# Patient Record
Sex: FEMALE | Race: BLACK OR AFRICAN AMERICAN | NOT HISPANIC OR LATINO | Employment: PART TIME | URBAN - METROPOLITAN AREA
[De-identification: names, ages, dates, MRNs, and addresses within clinical notes are randomized per-mention and may not be internally consistent; named-entity substitution may affect disease eponyms.]

---

## 2019-05-10 ENCOUNTER — HOSPITAL ENCOUNTER (EMERGENCY)
Facility: HOSPITAL | Age: 54
Discharge: HOME | End: 2019-05-10
Attending: EMERGENCY MEDICINE
Payer: MEDICAID

## 2019-05-10 VITALS
WEIGHT: 159 LBS | DIASTOLIC BLOOD PRESSURE: 86 MMHG | RESPIRATION RATE: 16 BRPM | BODY MASS INDEX: 26.49 KG/M2 | SYSTOLIC BLOOD PRESSURE: 135 MMHG | OXYGEN SATURATION: 99 % | HEIGHT: 65 IN | TEMPERATURE: 99 F | HEART RATE: 81 BPM

## 2019-05-10 DIAGNOSIS — R51.9 ACUTE NONINTRACTABLE HEADACHE, UNSPECIFIED HEADACHE TYPE: Primary | ICD-10-CM

## 2019-05-10 LAB
ALBUMIN SERPL-MCNC: 4 G/DL (ref 3.3–5.5)
ALP SERPL-CCNC: 75 U/L (ref 42–141)
BILIRUB SERPL-MCNC: 0.8 MG/DL (ref 0.2–1.6)
BILIRUBIN, POC UA: NEGATIVE
BLOOD, POC UA: ABNORMAL
BUN SERPL-MCNC: 11 MG/DL (ref 7–22)
CALCIUM SERPL-MCNC: 8.8 MG/DL (ref 8–10.3)
CHLORIDE SERPL-SCNC: 100 MMOL/L (ref 98–108)
CLARITY, POC UA: CLEAR
COLOR, POC UA: YELLOW
CREAT SERPL-MCNC: 0.9 MG/DL (ref 0.6–1.2)
GLUCOSE SERPL-MCNC: 89 MG/DL (ref 73–118)
GLUCOSE, POC UA: NEGATIVE
KETONES, POC UA: NEGATIVE
LEUKOCYTE EST, POC UA: NEGATIVE
NITRITE, POC UA: NEGATIVE
PH UR STRIP: 6 [PH]
POC ALT (SGPT): 41 U/L (ref 10–47)
POC AST (SGOT): 66 U/L (ref 11–38)
POC TCO2: 25 MMOL/L (ref 18–33)
POTASSIUM BLD-SCNC: 4.2 MMOL/L (ref 3.6–5.1)
PROTEIN, POC UA: NEGATIVE
PROTEIN, POC: 7 G/DL (ref 6.4–8.1)
SODIUM BLD-SCNC: 134 MMOL/L (ref 128–145)
SPECIFIC GRAVITY, POC UA: >=1.03
UROBILINOGEN, POC UA: 1 E.U./DL

## 2019-05-10 PROCEDURE — 96361 HYDRATE IV INFUSION ADD-ON: CPT | Mod: ER

## 2019-05-10 PROCEDURE — 85025 COMPLETE CBC W/AUTO DIFF WBC: CPT | Mod: ER

## 2019-05-10 PROCEDURE — 96374 THER/PROPH/DIAG INJ IV PUSH: CPT | Mod: ER

## 2019-05-10 PROCEDURE — 63600175 PHARM REV CODE 636 W HCPCS: Mod: ER | Performed by: EMERGENCY MEDICINE

## 2019-05-10 PROCEDURE — 80053 COMPREHEN METABOLIC PANEL: CPT | Mod: ER

## 2019-05-10 PROCEDURE — 99285 EMERGENCY DEPT VISIT HI MDM: CPT | Mod: 25,ER

## 2019-05-10 PROCEDURE — 25000003 PHARM REV CODE 250: Mod: ER | Performed by: EMERGENCY MEDICINE

## 2019-05-10 PROCEDURE — 81003 URINALYSIS AUTO W/O SCOPE: CPT | Mod: ER

## 2019-05-10 RX ORDER — KETOROLAC TROMETHAMINE 30 MG/ML
15 INJECTION, SOLUTION INTRAMUSCULAR; INTRAVENOUS
Status: COMPLETED | OUTPATIENT
Start: 2019-05-10 | End: 2019-05-10

## 2019-05-10 RX ORDER — DIPHENHYDRAMINE HCL 25 MG
25 CAPSULE ORAL EVERY 6 HOURS PRN
Qty: 20 CAPSULE | Refills: 0 | Status: SHIPPED | OUTPATIENT
Start: 2019-05-10

## 2019-05-10 RX ORDER — PROMETHAZINE HYDROCHLORIDE 25 MG/1
25 TABLET ORAL EVERY 6 HOURS PRN
Qty: 15 TABLET | Refills: 0 | Status: SHIPPED | OUTPATIENT
Start: 2019-05-10

## 2019-05-10 RX ORDER — FLUTICASONE PROPIONATE 50 MCG
1 SPRAY, SUSPENSION (ML) NASAL 2 TIMES DAILY
Qty: 1 BOTTLE | Refills: 0 | Status: SHIPPED | OUTPATIENT
Start: 2019-05-10

## 2019-05-10 RX ORDER — ACETAMINOPHEN 500 MG
1000 TABLET ORAL EVERY 6 HOURS PRN
Qty: 30 TABLET | Refills: 0 | Status: SHIPPED | OUTPATIENT
Start: 2019-05-10

## 2019-05-10 RX ORDER — IBUPROFEN 600 MG/1
600 TABLET ORAL EVERY 6 HOURS PRN
Qty: 20 TABLET | Refills: 0 | Status: SHIPPED | OUTPATIENT
Start: 2019-05-10

## 2019-05-10 RX ORDER — LORATADINE 10 MG/1
10 TABLET ORAL DAILY
Qty: 60 TABLET | Refills: 0 | Status: SHIPPED | OUTPATIENT
Start: 2019-05-10 | End: 2020-05-09

## 2019-05-10 RX ADMIN — SODIUM CHLORIDE 1000 ML: 0.9 INJECTION, SOLUTION INTRAVENOUS at 09:05

## 2019-05-10 RX ADMIN — KETOROLAC TROMETHAMINE 15 MG: 30 INJECTION, SOLUTION INTRAMUSCULAR at 09:05

## 2019-05-10 NOTE — ED PROVIDER NOTES
Encounter Date: 5/10/2019    SCRIBE #1 NOTE: I, Rosibel Stringer, am scribing for, and in the presence of,  Dr. Castellanos . I have scribed the following portions of the note - Other sections scribed: HPI,ROS,PE .       History     Chief Complaint   Patient presents with    Headache     pt reports headache that woke her up out of her sleep at 0200 this AM- took 1 aleve at 0530 with no relief     54 y/o/f without any PmHx presents with constant right temporal and near right eye headache that woke her up out of her sleep this morning at 2 am. Describes the pain as initially pressure but now feeling sharp/stabbing near the temple. Rates the pain a 10/10. Also complaining of sinus pressure. She took 1 aleve and genna seltzer cold and cough without relief. Patient has no hx of headache. FHx of aneurysms and stroke. Denies visual changes, numbness, tingling, chest pain, SOB, N/V/D, weakness, neck pain or fever.     The history is provided by the patient. No  was used.     Review of patient's allergies indicates:   Allergen Reactions    Codeine Itching    Morphine Itching     Past Medical History:   Diagnosis Date    Abnormal Pap smear of vagina      Past Surgical History:   Procedure Laterality Date    bunone removal (Right)  Right 1998    LASER ABLATION OF THE CERVIX  2000     Family History   Problem Relation Age of Onset    Stroke Father     Ovarian cancer Paternal Grandmother     Stroke Maternal Grandmother     Cancer Sister         hodgekin lymphoma, MI, CVA, PE     Social History     Tobacco Use    Smoking status: Never Smoker   Substance Use Topics    Alcohol use: No    Drug use: No     Review of Systems   Constitutional: Negative for fever.   HENT: Positive for sinus pressure. Negative for sore throat.    Eyes: Negative for visual disturbance.   Respiratory: Negative for shortness of breath.    Cardiovascular: Negative for chest pain.   Gastrointestinal: Negative for diarrhea, nausea  and vomiting.   Genitourinary: Negative for dysuria.   Musculoskeletal: Negative for back pain and neck pain.   Skin: Negative for rash.   Neurological: Positive for headaches. Negative for weakness and numbness.   Hematological: Does not bruise/bleed easily.   All other systems reviewed and are negative.      Physical Exam     Initial Vitals [05/10/19 0827]   BP Pulse Resp Temp SpO2   (!) 123/90 98 18 98.8 °F (37.1 °C) 97 %      MAP       --         Physical Exam    Nursing note and vitals reviewed.  Constitutional: She appears well-developed and well-nourished.   HENT:   Head: Normocephalic and atraumatic.   Right Ear: Tympanic membrane and external ear normal.   Left Ear: Tympanic membrane and external ear normal.   Nose: Mucosal edema and rhinorrhea present.   Mouth/Throat: Oropharynx is clear and moist.   Clear nasal discharge.   Eyes: Conjunctivae are normal.   Neck: Normal range of motion. Neck supple.   Cardiovascular: Normal rate, regular rhythm, normal heart sounds and intact distal pulses. Exam reveals no gallop and no friction rub.    No murmur heard.  Pulmonary/Chest: Effort normal and breath sounds normal. No respiratory distress. She has no wheezes. She has no rhonchi. She has no rales.   Musculoskeletal: Normal range of motion.   Neurological: She is alert and oriented to person, place, and time. She has normal strength. No cranial nerve deficit or sensory deficit. GCS score is 15. GCS eye subscore is 4. GCS verbal subscore is 5. GCS motor subscore is 6.   Skin: Skin is warm and dry. Capillary refill takes less than 2 seconds. No rash noted.   Psychiatric: She has a normal mood and affect. Her behavior is normal.       Patient gave consent to have physical exam performed.      ED Course   Procedures  Labs Reviewed   POCT URINALYSIS W/O SCOPE - Abnormal; Notable for the following components:       Result Value    Glucose, UA Negative (*)     Bilirubin, UA Negative (*)     Ketones, UA Negative (*)      Spec Grav UA >=1.030 (*)     Blood, UA 1+ (*)     Protein, UA Negative (*)     Nitrite, UA Negative (*)     Leukocytes, UA Negative (*)     All other components within normal limits   POCT CMP - Abnormal; Notable for the following components:    AST (SGOT), POC 66 (*)     All other components within normal limits   POCT CBC   POCT URINALYSIS W/O SCOPE   POCT CMP         CBC white blood cell count is 2.9, hemoglobin 14.7, hematocrit 43.4 and platelets 169.  UA negative glucose negative ketones negative nitrates negative leukocytes.  CMP within normal limits  Imaging Results          CT Head Without Contrast (Final result)  Result time 05/10/19 09:41:58    Final result by Tara Cano MD (05/10/19 09:41:58)                 Impression:      No acute abnormality.      Electronically signed by: Tara Cano MD  Date:    05/10/2019  Time:    09:41             Narrative:    EXAMINATION:  CT HEAD WITHOUT CONTRAST    CLINICAL HISTORY:  Headache, acute, norm neuro exam;    TECHNIQUE:  Low dose axial CT images obtained throughout the head without intravenous contrast. Sagittal and coronal reconstructions were performed.    COMPARISON:  None.    FINDINGS:  Intracranial compartment:    Ventricles and sulci are normal in size for age without evidence of hydrocephalus. No extra-axial blood or fluid collections.    The brain parenchyma appears normal. No parenchymal mass, hemorrhage, edema or major vascular distribution infarct.    Skull/extracranial contents (limited evaluation): No fracture. Mastoid air cells and paranasal sinuses are essentially clear.                                 Medical Decision Making:   Clinical Tests:   Lab Tests: Ordered and Reviewed  Radiological Study: Ordered and Reviewed  ED Management:  CT head ordered. IV fluids and toradol.      Treatment in the ED Physical Exam, Toradol and IV fluids  Patient reports feeling better after medication.    Discussed labs, and imaging results.    Fill and  take prescriptions as directed.  Return to the ED if symptoms worsen or do not resolve.   Answered questions and discussed discharge plan.    Patient feels better and is ready for discharge.  Follow up with PCP/specialist in 1 day.            Scribe Attestation:   Scribe #1: I performed the above scribed service and the documentation accurately describes the services I performed. I attest to the accuracy of the note.     I, Dr. Mary Castellanos, personally performed the services described in this documentation. This document was produced by a scribe under my direction and in my presence. All medical record entries made by the scribe were at my direction and in my presence.  I have reviewed the chart and agree that the record reflects my personal performance and is accurate and complete. Mary Castellanos DO.     05/10/2019 10:39 AM             Clinical Impression:     1. Acute nonintractable headache, unspecified headache type                                   Mary Castellanos DO  05/12/19 [id number]

## 2019-05-10 NOTE — DISCHARGE INSTRUCTIONS
At onset of headache take ibuprofen and Tylenol.  If headache persists 1 hr later take Benadryl and Phenergan and go to sleep in a dark quiet room

## 2022-01-24 ENCOUNTER — OFFICE VISIT (OUTPATIENT)
Dept: URGENT CARE | Facility: CLINIC | Age: 57
End: 2022-01-24
Payer: COMMERCIAL

## 2022-01-24 VITALS
HEART RATE: 92 BPM | WEIGHT: 155 LBS | OXYGEN SATURATION: 97 % | BODY MASS INDEX: 25.83 KG/M2 | TEMPERATURE: 98 F | DIASTOLIC BLOOD PRESSURE: 89 MMHG | RESPIRATION RATE: 16 BRPM | SYSTOLIC BLOOD PRESSURE: 128 MMHG | HEIGHT: 65 IN

## 2022-01-24 DIAGNOSIS — M25.551 HIP PAIN, ACUTE, RIGHT: ICD-10-CM

## 2022-01-24 DIAGNOSIS — M54.9 UPPER BACK PAIN: ICD-10-CM

## 2022-01-24 DIAGNOSIS — M54.41 ACUTE BILATERAL LOW BACK PAIN WITH RIGHT-SIDED SCIATICA: ICD-10-CM

## 2022-01-24 DIAGNOSIS — S16.1XXA STRAIN OF NECK MUSCLE, INITIAL ENCOUNTER: Primary | ICD-10-CM

## 2022-01-24 DIAGNOSIS — M54.2 NECK PAIN: ICD-10-CM

## 2022-01-24 PROCEDURE — 1160F RVW MEDS BY RX/DR IN RCRD: CPT | Mod: CPTII,S$GLB,, | Performed by: NURSE PRACTITIONER

## 2022-01-24 PROCEDURE — 72070 X-RAY EXAM THORAC SPINE 2VWS: CPT | Mod: S$GLB,,, | Performed by: RADIOLOGY

## 2022-01-24 PROCEDURE — 3079F PR MOST RECENT DIASTOLIC BLOOD PRESSURE 80-89 MM HG: ICD-10-PCS | Mod: CPTII,S$GLB,, | Performed by: NURSE PRACTITIONER

## 2022-01-24 PROCEDURE — 72040 XR CERVICAL SPINE 2 OR 3 VIEWS: ICD-10-PCS | Mod: S$GLB,,, | Performed by: RADIOLOGY

## 2022-01-24 PROCEDURE — 72040 X-RAY EXAM NECK SPINE 2-3 VW: CPT | Mod: S$GLB,,, | Performed by: RADIOLOGY

## 2022-01-24 PROCEDURE — 72100 XR LUMBAR SPINE 2 OR 3 VIEWS: ICD-10-PCS | Mod: S$GLB,,, | Performed by: RADIOLOGY

## 2022-01-24 PROCEDURE — 3008F BODY MASS INDEX DOCD: CPT | Mod: CPTII,S$GLB,, | Performed by: NURSE PRACTITIONER

## 2022-01-24 PROCEDURE — 72100 X-RAY EXAM L-S SPINE 2/3 VWS: CPT | Mod: S$GLB,,, | Performed by: RADIOLOGY

## 2022-01-24 PROCEDURE — 73502 XR HIP WITH PELVIS WHEN PERFORMED, 2 OR 3  VIEWS RIGHT: ICD-10-PCS | Mod: RT,S$GLB,, | Performed by: RADIOLOGY

## 2022-01-24 PROCEDURE — 3008F PR BODY MASS INDEX (BMI) DOCUMENTED: ICD-10-PCS | Mod: CPTII,S$GLB,, | Performed by: NURSE PRACTITIONER

## 2022-01-24 PROCEDURE — 72070 XR THORACIC SPINE AP LATERAL: ICD-10-PCS | Mod: S$GLB,,, | Performed by: RADIOLOGY

## 2022-01-24 PROCEDURE — 1160F PR REVIEW ALL MEDS BY PRESCRIBER/CLIN PHARMACIST DOCUMENTED: ICD-10-PCS | Mod: CPTII,S$GLB,, | Performed by: NURSE PRACTITIONER

## 2022-01-24 PROCEDURE — 3074F SYST BP LT 130 MM HG: CPT | Mod: CPTII,S$GLB,, | Performed by: NURSE PRACTITIONER

## 2022-01-24 PROCEDURE — 1159F PR MEDICATION LIST DOCUMENTED IN MEDICAL RECORD: ICD-10-PCS | Mod: CPTII,S$GLB,, | Performed by: NURSE PRACTITIONER

## 2022-01-24 PROCEDURE — 99203 OFFICE O/P NEW LOW 30 MIN: CPT | Mod: S$GLB,,, | Performed by: NURSE PRACTITIONER

## 2022-01-24 PROCEDURE — 1159F MED LIST DOCD IN RCRD: CPT | Mod: CPTII,S$GLB,, | Performed by: NURSE PRACTITIONER

## 2022-01-24 PROCEDURE — 99203 PR OFFICE/OUTPT VISIT, NEW, LEVL III, 30-44 MIN: ICD-10-PCS | Mod: S$GLB,,, | Performed by: NURSE PRACTITIONER

## 2022-01-24 PROCEDURE — 3074F PR MOST RECENT SYSTOLIC BLOOD PRESSURE < 130 MM HG: ICD-10-PCS | Mod: CPTII,S$GLB,, | Performed by: NURSE PRACTITIONER

## 2022-01-24 PROCEDURE — 73502 X-RAY EXAM HIP UNI 2-3 VIEWS: CPT | Mod: RT,S$GLB,, | Performed by: RADIOLOGY

## 2022-01-24 PROCEDURE — 3079F DIAST BP 80-89 MM HG: CPT | Mod: CPTII,S$GLB,, | Performed by: NURSE PRACTITIONER

## 2022-01-24 RX ORDER — NAPROXEN 500 MG/1
500 TABLET ORAL 2 TIMES DAILY PRN
Qty: 30 TABLET | Refills: 0 | Status: SHIPPED | OUTPATIENT
Start: 2022-01-24

## 2022-01-24 RX ORDER — METHOCARBAMOL 500 MG/1
500 TABLET, FILM COATED ORAL 3 TIMES DAILY PRN
Qty: 21 TABLET | Refills: 0 | Status: SHIPPED | OUTPATIENT
Start: 2022-01-24

## 2022-01-24 NOTE — PROGRESS NOTES
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 Lift one leg up and kick it straight out until it is in line with your back and hold. Lower it back down. Now, lift up the other leg and hold.  ? Lift one arm and the OPPOSITE leg up at the same time and hold. Lower them down. Now, repeat using the other arm and leg. This is a very hard exercise. It may take time to be able to do this.             What will the results be?   · Better flexibility  · Less pain  · Less numbness and tingling  · More leg strength  · Easier to walk and do other activities  · Increased core strength  Helpful tips   · Stay active and work out to keep your muscles strong and flexible.  · Keep a healthy weight to avoid putting too much stress on your spine. Eat a healthy diet to keep your muscles healthy.  · Be sure you do not hold your breath when exercising. This can raise your blood pressure. If you tend to hold your breath, try counting out loud when exercising. If any exercise bothers you, stop right away.  · Always warm up before stretching. Heated muscles stretch much easier than cool muscles. Stretching cool muscles can lead to injury.  · Try walking or cycling at an easy pace for a few minutes to warm up your muscles. Do this again after exercising.  · Never bounce when doing stretches.  · Apply ice to the low  back on the side of leg pain.  · Doing exercises before a meal may be a good way to get into a routine.  Where can I learn more?   [facility] Choices  http://www.nhs.uk/[facility]/Backpain/Pages/sciatica-exercises.aspx   Last Reviewed Date   2021-08-30  Consumer Information Use and Disclaimer   This information is not specific medical advice and does not replace information you receive from your health care provider. This is only a brief summary of general information. It does NOT include all information about conditions, illnesses, injuries, tests, procedures, treatments, therapies, discharge instructions or life-style choices that may apply to you. You must talk with your health care provider for complete information about your health and treatment options. This information should not be used to decide whether or not to accept your health care providers advice, instructions or recommendations. Only your health care provider has the knowledge and training to provide advice that is right for you.  Copyright   Copyright © 2021 [employer] and its affiliates and/or licensors. All rights reserved.  Patient Education       Low Back Pain Discharge Instructions   About this topic   Low back pain is a pain or discomfort in the lower part of your back and spinal column. You may have a muscle strain. This happens when a muscle is stretched too much or works too hard. It can also happen if a muscle is stretched too quickly. This is also known as a pulled muscle. Many people have low back pain at some point and it most often gets better on its own.       What care is needed at home?   Back pain is common. In most cases, your back will feel better in 1 to 3 weeks. You may need to have help at home if you are not able to do your normal activities right away. Some people need help with things like cooking or bathing.  · Ask your doctor what you need to do when you go home. Make sure you ask questions if you do not understand what the  doctor says. This way you will know what you need to do.  · For the first 2 days, put ice on your back a few times a day. Wrap an ice pack in a towel and put it on your back for 10 to 15 minutes at a time. After 2 days, you may want to use heat on your back. Put a heating pad on your back for 20 minutes at a time a few times each day. Never go to sleep with heat or ice on your back.  · Stay as active as you can without causing too much pain. It is OK to rest your back for a day or so. Be sure to get up and move around gently during the day as you are able. After a few days, slowly start to increase your activity level as you are able to. If something causes your pain to come back or get worse, stop and go back to doing easier activities that did not hurt.  · Protect your back.  ? Limit sports, twisting, and heavy lifting until you are fully recovered.  ? Practice good posture to lower pressure on your spine.  ? When lifting, hold the object close to your body, keep your back straight, and use your leg muscles to slowly stand.  · Do not sit or  one position for a long time. You may want to sleep with a pillow under or between your knees if this eases your pain.  · You may want to take medicine like ibuprofen or naproxen for swelling and pain. These are nonsteroidal anti-inflammatory drugs (NSAIDs).  · A lumbar support belt may help you be more comfortable. This supports your pelvis and eases pain.  · Your doctor may order exercises to help your back. Be sure to do these as ordered.  What follow-up care is needed?   Your doctor may ask you to make visits to the office to check on your progress. Be sure to keep these visits. Your doctor may send you to other experts and therapists to help you with your pain.   What drugs may be needed?   The doctor may order drugs to:   · Help with pain and swelling  · Relax your muscles  Will physical activity be limited?   You may have to limit your activity. Talk to your  doctor about the right amount of activity for you.   When do I need to call the doctor?   · You are unable to walk or cannot control your bowels or bladder.  · You develop a fever of 100.4°F (38°C) or higher, chills, or night sweats  · Your legs are numb, weak, or tingly.  · Your pain is getting worse, even with medicines and rest.  · You feel weak and light-headed.  · You develop any of the following:  ? Belly pain.  ? Throwing up.  ? Pain with urination or need to urinate more often.  ? Vaginal pain or discharge.  ? Rash.  Teach Back: Helping You Understand   The Teach Back Method helps you understand the information we are giving you. After you talk with the staff, tell them in your own words what you learned. This helps to make sure the staff has described each thing clearly. It also helps to explain things that may have been confusing. Before going home, make sure you can do these:  · I can tell you about my pain.  · I can tell you what may help ease my pain.  · I can tell you what I will do if I have numbness or tingling in my legs, feet, or genitals.  Where can I learn more?   American Academy [employer]  https://familydoctor.org/condition/low-back-pain/   National [place] of Arth[employer] and Musculoskeletal and Skin Diseases  http://www.niams.nih.gov/Health_Info/Back_Pain/back_pain_ff.asp   [facility] Choices  https://www.nhs.uk/Conditions/Back-pain/   Last Reviewed Date   2021-06-04  Consumer Information Use and Disclaimer   This information is not specific medical advice and does not replace information you receive from your health care provider. This is only a brief summary of general information. It does NOT include all information about conditions, illnesses, injuries, tests, procedures, treatments, therapies, discharge instructions or life-style choices that may apply to you. You must talk with your health care provider for complete information about your health and treatment options. This  information should not be used to decide whether or not to accept your health care providers advice, instructions or recommendations. Only your health care provider has the knowledge and training to provide advice that is right for you.   Copyright   Copyright © 2021 [employer] and its affiliates and/or licensors. All rights reserved.       -Robaxin as prescribed. Do not drink alcohol, drive, work, or operate heavy machinery while taking this medication, it may cause drowsiness.    -Naproxen as needed for pain.     INSTRUCTIONS:  - Rest.  - Drink plenty of fluids.  - Take Tylenol and/or Ibuprofen as directed as needed for fever/pain.  Do not take more than the recommended dose.  - follow up with your PCP within the next 1-2 weeks as needed.  - You must understand that you have received an Urgent Care treatment only and that you may be released before all of your medical problems are known or treated.   - You, the patient, will arrange for follow up care as instructed.   - If your condition worsens or fails to improve we recommend that you receive another evaluation at the ER immediately or contact your PCP to discuss your concerns.   - You can call [phone] or [phone] to help schedule an appointment with the appropriate provider.              THANK YOU FOR ALLOWING ME TO PARTICIPATE IN YOUR HEALTHCARE,     Scotty Seymour NP   Strain of neck muscle, initial encounter  -     methocarbamoL (ROBAXIN) 500 MG Tab; Take 1 tablet (500 mg total) by mouth 3 (three) times daily as needed (pain/muscle spasms).  Dispense: 21 tablet; Refill: 0    Neck pain  -     XR Cervical Spine 2 or 3 Views; Future; Expected date: 01/24/2022    Acute bilateral low back pain with right-sided sciatica  -     XR LUMBAR SPINE 2 OR 3 VIEWS; Future; Expected date: 01/24/2022    Upper back pain  -     XR THORACIC SPINE AP LATERAL; Future; Expected date: 01/24/2022    Hip pain, acute, right  -     X-Ray Hip 2 or 3 views Right  (with Pelvis when performed); Future; Expected date: 01/24/2022  -     naproxen (NAPROSYN) 500 MG tablet; Take 1 tablet (500 mg total) by mouth 2 (two) times daily as needed (pain).  Dispense: 30 tablet; Refill: 0

## 2022-01-25 NOTE — PATIENT INSTRUCTIONS
Patient Education       Cervical Muscle Strain   About this topic   A muscle strain happens when the muscle is stretched too much. A muscle strain is also called a pulled muscle. In some cases, your muscle may bleed and you may see bruising on the skin. When you strain the muscles in your neck, the injury is called a cervical muscle strain.  Whiplash often happens with car crashes. Your neck muscles or tendons are stretched causing a strain. There are also strong bands of tissue that hold the bones of your spine together called ligaments. If your ligaments are stretched, you have a sprain.     What are the causes?   · Car crash  · Sports injury  · Blow to the head  · Lifting something too heavy  · Falling  · Overuse of neck muscles or repeat movements in the neck area  · A sudden, forceful movement in the neck area  · A direct force from behind causing your neck to move quickly forward and then back is whiplash.  What can make this more likely to happen?   · Unsafe driving ? not obeying traffic laws like using seat belts and driving within speed limits  · Playing contact sports  · Playing sports or doing things that have you twisting, like gymnastics  · Not warming up before a workout  · Poor posture  · Being too tired  What are the main signs?   · Pain or soreness when you touch the back of your neck  · Stiffness or trouble moving your neck  · Muscle spasms  · Headache  · Swelling in the neck area  · Numbness or pain shooting down the neck to the shoulder or arm  · Weakness in the arms or legs  · Bruising or redness in the neck area  · Trouble sleeping  How does the doctor diagnose this health problem?   Your doctor will look at your neck area and feel around to find where the problem is. Your doctor may also have you move your neck and arms in certain ways to check your motion and strength. Your doctor may also check the reflexes in your arm. The doctor may order:  · X-ray  · CT or MRI scan  How does the doctor  treat this health problem?   · Rest  · Ice  · Using a neck brace to keep the neck from moving. The neck brace is called an immobilizer.  · Exercises  · Heat may be used later but not right away. Heat can make swelling worse.  · Massage  · Physical therapy (PT)  · Chiropractor  · Surgery is only needed if there are other serious injuries.  What drugs may be needed?   The doctor may order drugs to:  · Help with pain and swelling  · Relax muscles  Will physical activity be limited?   You may need to rest for a while. You should not do physical activity that makes your health problem worse. Talk to your doctor if you run, work out, or play sports. You may not be able to do those things until your health problem gets better.  What can be done to prevent this health problem?   · Always wear a seat belt. Drive safely. Obey speed limits. Do not drink and drive. Do not text and drive.  · Have headrests in the car at the right height. The middle of the headrest should be even with the upper parts of your ears.  · Use good posture. Do not slouch.  · Take breaks often when doing things that use repeat movements.  · Warm up slowly and stretch before you work out. Use good ways to train, such as slowly adding to how far you run. Do not work out if you are overly tired. Take extra care if working out in cold weather.  · Keep a healthy weight so there is not extra stress on your joints. Eat a healthy diet to keep your muscles healthy.  · Stay active and work out to keep your muscles strong and flexible.  · Use the proper pillow.  Where can I learn more?   American Academy [employer]  http://orthoinfo.aaos.org/topic.cfm?mfryz=E17658   [employer]  https://www.betterhealth.brand[place].gov.au/health/ConditionsAndTreatments/neck-pain   Last Reviewed Date   2020-10-28  Consumer Information Use and Disclaimer   This information is not specific medical advice and does not replace information you receive from your health care  provider. This is only a brief summary of general information. It does NOT include all information about conditions, illnesses, injuries, tests, procedures, treatments, therapies, discharge instructions or life-style choices that may apply to you. You must talk with your health care provider for complete information about your health and treatment options. This information should not be used to decide whether or not to accept your health care providers advice, instructions or recommendations. Only your health care provider has the knowledge and training to provide advice that is right for you.  Copyright   Copyright © 2021 [employer] and its affiliates and/or licensors. All rights reserved.  Patient Education       Sciatica Discharge Instructions   About this topic   You may have pain, weakness, numbness, and tingling that runs from your buttocks down the back of your leg to your feet. This is called sciatica. Your sciatic nerve is a large nerve that starts in your lower back. It runs all the way down the back of your leg. You may have something like a disc or bone spur pressing on this nerve. When something is pressing on or bothering this nerve, it can cause sciatica. This is the medical name for pain, weakness, numbness, or tingling that goes from your buttock down your leg towards your heel. You can have sciatic pain on one side or on both sides. Most of the time, it will get better without surgery.       What care is needed at home?   · Ask the doctor what you need to do when you go home. Make sure you ask questions if you do not understand what the doctor says. This way you will know what to do.  · Stay as active as you can without causing too much pain. It is OK to rest your back for a day or so. Be sure to get up and move around gently during the day as you are able. After a few days, slowly start to increase your activity level as you are able to. If something causes your pain to come back or get  worse, stop and go back to doing easier activities that did not hurt.  · Do not sit or  one position for a long time. You may want to sleep with a pillow under or between your knees if this eases your pain.  · You may want to take medicines like ibuprofen or naproxen for swelling and pain. These are nonsteroidal anti-inflammatory drugs (NSAIDS).  What follow-up care is needed?   · Your doctor may ask you to make visits to the office to check on your progress. Be sure to keep these visits.  · Your doctor may send you to physical therapy (PT) or a chiropractor for treatments to lessen pain and to learn the right exercises to do.  · Your doctor may also send you to a neurologist. This is a doctor who specializes in treating nerve problems.  · If you do not get better with treatment, your doctor may need to send you to an orthopedic surgeon.  What drugs may be needed?   The doctor may order drugs to:  · Help with pain and swelling  The doctor may give you a shot of an anti-inflammatory drug called a corticosteroid. This will help with swelling. Talk with your doctor about the risks of this shot.  Will physical activity be limited?   You may need to rest for a while. You should not do physical activity that makes your health problem worse. Talk to your doctor if you run, work out, or play sports. You may not be able to do those things until your health problem get better.  What problems could happen?   · Long-term back pain  · Loss of feeling or movement in the legs or feet  · Weight gain, less muscle strength and flexibility, weaker bones  · Need for surgery  · Infection  · Loss of bowel and bladder function  What can be done to prevent this health problem?   · Stay active and work out to keep your muscles strong and flexible. Warm up slowly and stretch before you exercise.  · Use good posture.  · Use proper ways to lift and bend:  ? Spread your feet apart so you have a good base of support. Then, bend with your  knees when you  something from the ground.  ? When lifting and moving an object, keep your back straight. Keep the object as close to your body as possible. Do not twist. Instead, move your feet to the direction you are going.  · Take breaks often when seated for long periods of time. Get up and walk around from time to time.  · If you stand for long periods, put one leg up on a small stool for a while. Then, change legs.  · If you sleep on your side, put a pillow in between your knees to keep your back and legs in a good position.  · Use good supportive footwear. Avoid high heels.  · Keep a healthy weight.  When do I need to call the doctor?   · You are unable to walk or start having trouble controlling your bowels or bladder.  · You get new or worsening pain, numbness, or weakness that spreads to both legs.  · Your pain is getting worse, even with medicines and rest.  · You are not able do your normal activities because of the pain.  Helpful tips   · Water exercise or biking may help you stay in shape without making your problem worse.  · The right exercises for sciatica will depend on what the problem is that is causing the pain. Talk with your doctor about which stretches are best for you.  · Stretching may be slightly painful but should never give sharp pains. If it is painful, ease up until you only feel mild stretching. All stretching exercises should be held for 20 to 30 seconds to be most helpful. Repeat 2 to 3 times. Do 2 to 3 times each day to get the best results.  ? Lie on your back. Bend up the knee of the painful side until your foot is even with the other knee. Keeping your shoulders down, slowly drop the bent knee across the other leg. Do this until you feel a stretch in your buttocks.  ? Lie on your back with your knees bent and feet flat on the ground. If the problem is on your right leg, cross your right ankle onto your left thigh just above the knee. Reach your right arm in between your  thighs and clasp your hands around your left thigh. Slowly pull the left thigh up towards your chest until you feel stretching in the right buttock.  Teach Back: Helping You Understand   The Teach Back Method helps you understand the information we are giving you. After you talk with the staff, tell them in your own words what you learned. This helps to make sure the staff has described each thing clearly. It also helps to explain things that may have been confusing. Before going home, make sure you can do these:  · I can tell you about my condition.  · I can tell you what may help ease my pain.  · I can tell you what I will do if I have more pain or numbness in my leg or foot.  Where can I learn more?   American Academy of Family Physicians  https://familydoctor.org/condition/piriformis-syndrome/   [employer] [place]  https://www.healthnavigator.org.nz/health-a-z/s/sciatica/   [facility] [place]  https://www.nhs.uk/conditions/sciatica/   Last Reviewed Date   2021-06-21  Consumer Information Use and Disclaimer   This information is not specific medical advice and does not replace information you receive from your health care provider. This is only a brief summary of general information. It does NOT include all information about conditions, illnesses, injuries, tests, procedures, treatments, therapies, discharge instructions or life-style choices that may apply to you. You must talk with your health care provider for complete information about your health and treatment options. This information should not be used to decide whether or not to accept your health care providers advice, instructions or recommendations. Only your health care provider has the knowledge and training to provide advice that is right for you.  Copyright   Copyright © 2021 [employer] and its affiliates and/or licensors. All rights reserved.  Patient Education       Sciatica Exercises   About this topic   Sciatica is pain,  weakness, numbness, or tingling that runs from your buttocks down the back of your leg to your feet. It happens when something is pressing on, or bothering, the sciatic nerve. This large nerve starts in your lower back. It runs all the way down the back of your leg to your foot.  The exercises for sciatica may be different based on the cause of your pain. Exercise may be slightly uncomfortable, but you should not have sharp pains. If you do get sharp pains, stop what you are doing. If the sharp pains continue, call your doctor.  General   Before starting with a program, ask your doctor if you are healthy enough to do these exercises. Your doctor may have you work with a chiropractor, , or physical therapist to make a safe exercise program to meet your needs.  Stretching Exercises   Stretching exercises keep your muscles flexible. They also stop them from getting tight. Start by doing each of these stretches 2 to 3 times. In order for your body to make changes, you will need to hold these stretches for 20 to 30 seconds. Try to do the stretches 2 to 3 times each day. Do all exercises slowly.  · Single knee to chest stretches ? Lie on your back. Pull one knee towards your chest until you feel a stretch in your lower back and buttock area. Repeat with the other knee. If you have knee problems, pull your knee up by grabbing the back of your thigh instead of the front of your knee. You can also do this exercise by grabbing both knees at the same time.  · Deep hip stretches lying down ? Lie on your back and bend one knee, keeping that foot flat on the floor. Cross the other leg over your knee. Grab the thigh of the leg that has the foot on the floor. Slowly, pull the bottom leg towards your chest until you feel a stretch in the other buttock. Repeat using the opposite leg as the bottom leg.  · Deep hip stretches sitting ? Sit on the floor with both legs straight. Take one leg and cross it over the other leg so that  the foot of your top leg is next to your outer knee. Now, take the elbow on the opposite side of your bent knee and bring it to the outside of the bent knee. With your elbow, slowly push the bent knee further across the body to get a good stretch in the hip.  Strengthening Exercises   Strengthening exercises keep your muscles firm and strong. Start by repeating each exercise 2 to 3 times. Work up to doing each exercise 10 times. Try to do the exercises 2 to 3 times each day. Hold each exercise for 3 to 5 seconds. Do all exercises slowly.  · Hip lifts ? Lie on your back with your knees bent and feet flat on the floor. Tighten your stomach muscles and lift your buttocks off the floor. Relax.  · Arm and leg lifts on hands and knees ? Start on your hands and knees. With all of these exercises, keep your back as level as possible. If you are having trouble with this, you may want to put a small object on your back such as a book. If it falls off, you are not keeping your back level enough during the exercise.  ? Lift one arm up to shoulder level and hold. Lower it back down. Now, lift up the other arm and hold.  ? Lift one leg up and kick it straight out until it is in line with your back and hold. Lower it back down. Now, lift up the other leg and hold.  ? Lift one arm and the OPPOSITE leg up at the same time and hold. Lower them down. Now, repeat using the other arm and leg. This is a very hard exercise. It may take time to be able to do this.             What will the results be?   · Better flexibility  · Less pain  · Less numbness and tingling  · More leg strength  · Easier to walk and do other activities  · Increased core strength  Helpful tips   · Stay active and work out to keep your muscles strong and flexible.  · Keep a healthy weight to avoid putting too much stress on your spine. Eat a healthy diet to keep your muscles healthy.  · Be sure you do not hold your breath when exercising. This can raise your blood  pressure. If you tend to hold your breath, try counting out loud when exercising. If any exercise bothers you, stop right away.  · Always warm up before stretching. Heated muscles stretch much easier than cool muscles. Stretching cool muscles can lead to injury.  · Try walking or cycling at an easy pace for a few minutes to warm up your muscles. Do this again after exercising.  · Never bounce when doing stretches.  · Apply ice to the low back on the side of leg pain.  · Doing exercises before a meal may be a good way to get into a routine.  Where can I learn more?   [facility] Choices  http://www.nhs.uk/[place]/Backpain/Pages/sciatica-exercises.aspx   Last Reviewed Date   2021-08-30  Consumer Information Use and Disclaimer   This information is not specific medical advice and does not replace information you receive from your health care provider. This is only a brief summary of general information. It does NOT include all information about conditions, illnesses, injuries, tests, procedures, treatments, therapies, discharge instructions or life-style choices that may apply to you. You must talk with your health care provider for complete information about your health and treatment options. This information should not be used to decide whether or not to accept your health care providers advice, instructions or recommendations. Only your health care provider has the knowledge and training to provide advice that is right for you.  Copyright   Copyright © 2021 [employer], [employer]. and its affiliates and/or licensors. All rights reserved.  Patient Education       Low Back Pain Discharge Instructions   About this topic   Low back pain is a pain or discomfort in the lower part of your back and spinal column. You may have a muscle strain. This happens when a muscle is stretched too much or works too hard. It can also happen if a muscle is stretched too quickly. This is also known as a pulled muscle. Many people have low back pain at  some point and it most often gets better on its own.       What care is needed at home?   Back pain is common. In most cases, your back will feel better in 1 to 3 weeks. You may need to have help at home if you are not able to do your normal activities right away. Some people need help with things like cooking or bathing.  · Ask your doctor what you need to do when you go home. Make sure you ask questions if you do not understand what the doctor says. This way you will know what you need to do.  · For the first 2 days, put ice on your back a few times a day. Wrap an ice pack in a towel and put it on your back for 10 to 15 minutes at a time. After 2 days, you may want to use heat on your back. Put a heating pad on your back for 20 minutes at a time a few times each day. Never go to sleep with heat or ice on your back.  · Stay as active as you can without causing too much pain. It is OK to rest your back for a day or so. Be sure to get up and move around gently during the day as you are able. After a few days, slowly start to increase your activity level as you are able to. If something causes your pain to come back or get worse, stop and go back to doing easier activities that did not hurt.  · Protect your back.  ? Limit sports, twisting, and heavy lifting until you are fully recovered.  ? Practice good posture to lower pressure on your spine.  ? When lifting, hold the object close to your body, keep your back straight, and use your leg muscles to slowly stand.  · Do not sit or  one position for a long time. You may want to sleep with a pillow under or between your knees if this eases your pain.  · You may want to take medicine like ibuprofen or naproxen for swelling and pain. These are nonsteroidal anti-inflammatory drugs (NSAIDs).  · A lumbar support belt may help you be more comfortable. This supports your pelvis and eases pain.  · Your doctor may order exercises to help your back. Be sure to do these as  ordered.  What follow-up care is needed?   Your doctor may ask you to make visits to the office to check on your progress. Be sure to keep these visits. Your doctor may send you to other experts and therapists to help you with your pain.   What drugs may be needed?   The doctor may order drugs to:   · Help with pain and swelling  · Relax your muscles  Will physical activity be limited?   You may have to limit your activity. Talk to your doctor about the right amount of activity for you.   When do I need to call the doctor?   · You are unable to walk or cannot control your bowels or bladder.  · You develop a fever of 100.4°F (38°C) or higher, chills, or night sweats  · Your legs are numb, weak, or tingly.  · Your pain is getting worse, even with medicines and rest.  · You feel weak and light-headed.  · You develop any of the following:  ? Belly pain.  ? Throwing up.  ? Pain with urination or need to urinate more often.  ? Vaginal pain or discharge.  ? Rash.  Teach Back: Helping You Understand   The Teach Back Method helps you understand the information we are giving you. After you talk with the staff, tell them in your own words what you learned. This helps to make sure the staff has described each thing clearly. It also helps to explain things that may have been confusing. Before going home, make sure you can do these:  · I can tell you about my pain.  · I can tell you what may help ease my pain.  · I can tell you what I will do if I have numbness or tingling in my legs, feet, or genitals.  Where can I learn more?   American Academy of Family Physicians  https://familydoctor.org/condition/low-back-pain/   [employer] [place] of [employer] and Musculoskeletal and Skin Diseases  http://www.niams.nih.gov/Health_Info/Back_Pain/back_pain_ff.asp   [facility] Choices  https://www.nhs.uk/Conditions/Back-pain/   Last Reviewed Date   2021-06-04  Consumer Information Use and Disclaimer   This information is not specific medical advice  and does not replace information you receive from your health care provider. This is only a brief summary of general information. It does NOT include all information about conditions, illnesses, injuries, tests, procedures, treatments, therapies, discharge instructions or life-style choices that may apply to you. You must talk with your health care provider for complete information about your health and treatment options. This information should not be used to decide whether or not to accept your health care providers advice, instructions or recommendations. Only your health care provider has the knowledge and training to provide advice that is right for you.   Copyright   Copyright © 2021 [employer], [employer]. and its affiliates and/or licensors. All rights reserved.       -Robaxin as prescribed. Do not drink alcohol, drive, work, or operate heavy machinery while taking this medication, it may cause drowsiness.    -Naproxen as needed for pain.     INSTRUCTIONS:  - Rest.  - Drink plenty of fluids.  - Take Tylenol and/or Ibuprofen as directed as needed for fever/pain.  Do not take more than the recommended dose.  - follow up with your PCP within the next 1-2 weeks as needed.  - You must understand that you have received an Urgent Care treatment only and that you may be released before all of your medical problems are known or treated.   - You, the patient, will arrange for follow up care as instructed.   - If your condition worsens or fails to improve we recommend that you receive another evaluation at the ER immediately or contact your PCP to discuss your concerns.   - You can call [phone] or [phone] to help schedule an appointment with the appropriate provider.